# Patient Record
Sex: FEMALE | Race: WHITE | NOT HISPANIC OR LATINO | URBAN - METROPOLITAN AREA
[De-identification: names, ages, dates, MRNs, and addresses within clinical notes are randomized per-mention and may not be internally consistent; named-entity substitution may affect disease eponyms.]

---

## 2024-11-09 ENCOUNTER — EMERGENCY (EMERGENCY)
Facility: HOSPITAL | Age: 34
LOS: 1 days | Discharge: HOME | End: 2024-11-09
Attending: STUDENT IN AN ORGANIZED HEALTH CARE EDUCATION/TRAINING PROGRAM | Admitting: STUDENT IN AN ORGANIZED HEALTH CARE EDUCATION/TRAINING PROGRAM
Payer: COMMERCIAL

## 2024-11-09 VITALS
HEART RATE: 80 BPM | OXYGEN SATURATION: 98 % | HEIGHT: 65 IN | DIASTOLIC BLOOD PRESSURE: 80 MMHG | TEMPERATURE: 98 F | RESPIRATION RATE: 16 BRPM | SYSTOLIC BLOOD PRESSURE: 138 MMHG | WEIGHT: 199.96 LBS

## 2024-11-09 PROCEDURE — 73110 X-RAY EXAM OF WRIST: CPT | Mod: 26,RT

## 2024-11-09 PROCEDURE — 73090 X-RAY EXAM OF FOREARM: CPT

## 2024-11-09 PROCEDURE — 99284 EMERGENCY DEPT VISIT MOD MDM: CPT

## 2024-11-09 PROCEDURE — 73080 X-RAY EXAM OF ELBOW: CPT | Mod: 26,RT

## 2024-11-09 PROCEDURE — 73090 X-RAY EXAM OF FOREARM: CPT | Mod: 26,RT

## 2024-11-09 PROCEDURE — 73080 X-RAY EXAM OF ELBOW: CPT

## 2024-11-09 PROCEDURE — 73110 X-RAY EXAM OF WRIST: CPT

## 2024-11-09 PROCEDURE — 99284 EMERGENCY DEPT VISIT MOD MDM: CPT | Mod: 25

## 2024-11-09 RX ORDER — IBUPROFEN 200 MG
600 TABLET ORAL ONCE
Refills: 0 | Status: COMPLETED | OUTPATIENT
Start: 2024-11-09 | End: 2024-11-09

## 2024-11-09 RX ADMIN — Medication 600 MILLIGRAM(S): at 20:57

## 2024-11-09 NOTE — ED PROVIDER NOTE - NSFOLLOWUPINSTRUCTIONS_ED_ALL_ED_FT
Joint pain can be caused by many things. It may go away if you follow instructions from your health care provider for taking care of yourself at home. Sometimes, you may need more treatment.    Joint pain can be caused by:  Bruises at the area of the joint.  An injury caused by movements that are repeated.  Wear and tear on the joint as you get older.  Buildup of uric acid crystals in the joint. This is also called gout.  Irritation and swelling of the joint.  Types of arthritis.  Infections of the joint or of the bone.  Your provider may tell you to take pain medicine or wear an elastic bandage, sling, or splint. If your joint pain continues, you may need lab or imaging tests to find the cause of your joint pain.    Follow these instructions at home:  If you have an elastic bandage, sling, or splint that can be taken off:    Wear the bandage, sling, or splint as told by your provider. Take it off only if your provider says you can.  Check the skin under and around it every day. Tell your provider if you see problems.  Loosen it if your fingers or toes tingle, are numb, or turn cold and blue.  Keep it clean and dry.  Ask your provider if you should remove it before bathing.  If the bandage, sling, or splint is not waterproof:  Do not let it get wet.  Cover it when you take a bath or shower. Use a cover that does not let any water in.  Managing pain, stiffness, and swelling    Bag of ice on a towel on the skin.  A heating pad being used on the painful area.  If told, put ice on the area.  If you have an elastic bandage, sling, or splint that you can take off, remove it as told.  Put ice in a plastic bag.  Place a towel between your skin and the bag.  Leave the ice on for 20 minutes, 2–3 times a day.  If told, put heat on the area. Do this as often as told. Use the heat source that your provider recommends, such as a moist heat pack or a heating pad.  Place a towel between your skin and the heat source.  Leave the heat on for 20–30 minutes.  If your skin turns bright red, take off the ice or heat right away to prevent skin damage. The risk of damage is higher if you can't feel pain, heat, or cold.  Move your fingers or toes often to reduce stiffness and swelling.  Raise the injured area above the level of your heart while you're sitting or lying down. Use a pillow to support the painful area as needed.  Activity    Rest the painful joint as told.  Do not do things that cause pain or make pain worse.  Begin exercising or stretching the affected area as told by your provider.  Return to normal activities when you are told. Ask what things are safe for you to do.  General instructions    Take your medicines as told by your provider. Treatment may include medicines for pain and swelling that are taken by mouth or applied to the skin.  Do not smoke, vape, or use products with nicotine or tobacco in them. If you need help quitting, talk with your provider.  Keep all follow-up visits. Your provider will want to check on your condition.  Contact a health care provider if:  You have pain that does not get better with medicine.  Your joint pain does not improve within 3 days.  You have more bruising or swelling.  You have a fever.  You lose 10 lb (4.5 kg) or more without trying.  Get help right away if:  You cannot move the joint.  Your fingers or toes tingle, become numb, or turn cold and blue.  You have a fever along with a joint that's red, warm, and swollen. adial Head Fracture  Bones and nerves of the arm, with a close-up showing a type 3 fracture in the radial head.   A radial head fracture is a break in one of the bones of the forearm (radius), near the elbow joint. There are two bones in the forearm. The radius, or radial bone, is the bone on the side of the thumb.    There are different types of radial head fractures. The type is determined by the amount of movement (displacement) of bones from their normal positions:  Type 1. This is a small fracture in which the bone pieces remain together (nondisplaced fracture).  Type 2. The fracture is moderate, and bone pieces are slightly displaced.  Type 3. There are multiple fractures and displaced bone pieces.  What are the causes?  This condition is usually caused by falling and landing on an outstretched arm.    What increases the risk?  You are more likely to develop this condition if you:  Are an older female.  Participate in sports that are more likely to cause falls while running or jumping.  Have weak bones (osteoporosis).  What are the signs or symptoms?  Symptoms of this condition include:  Swelling of the elbow joint.  Pain and difficulty when moving the elbow joint, such as when straightening the elbow or rotating the forearm.  How is this diagnosed?  This condition is diagnosed based on your medical history and a physical exam. You may have X-rays to confirm the type of fracture.    How is this treated?  Treatment for this condition includes resting, icing, and raising (elevating) the injured area above the level of your heart. You may be given medicines to help relieve pain.    Treatment varies depending on the type of fracture you have.  For a type 1 fracture, you may be given a splint or sling to keep your arm and elbow from moving for several days.  For a type 2 fracture, you may be given a splint or sling to keep your arm and elbow from moving for several days. If the displacement is more severe, you may need surgery.  For a type 3 fracture, you will usually need surgery to have bone pieces removed. The entire radial head may need to be removed if the damage is severe. The fracture will be stabilized with a splint and sling.  Follow these instructions at home:  Medicines    Take over-the-counter and prescription medicines only as told by your health care provider.  Ask your health care provider if the medicine prescribed to you:  Requires you to avoid driving or using machinery.  Can cause constipation. You may need to take these actions to prevent or treat constipation:  Drink enough fluid to keep your urine pale yellow.  Take over-the-counter or prescription medicines.  Eat foods that are high in fiber, such as beans, whole grains, and fresh fruits and vegetables.  Limit foods that are high in fat and processed sugars, such as fried or sweet foods.  If you have a removable splint or sling:    Wear the splint or sling as told by your health care provider. Remove it only as told by your health care provider.  Check the skin around the splint or sling every day. Tell your health care provider about any concerns.  Loosen the splint or sling if your fingers tingle, become numb, or turn cold and blue.  Keep it clean and dry.  If you have a nonremovable splint:    Do not put pressure on any part of the splint until it is fully hardened. This may take several hours.  Do not stick anything inside the splint to scratch your skin. Doing that increases your risk of infection.  Check the skin around the splint every day. Tell your health care provider about any concerns.  You may put lotion on dry skin around the edges of the splint. Do not put lotion on the skin underneath the splint.  Keep it clean and dry.  Bathing    Do not take baths, swim, or use a hot tub until your health care provider approves. Ask your health care provider if you may take showers. You may only be allowed to take sponge baths.  If the splint or sling is not waterproof:  Do not let it get wet.  Cover it with a watertight covering when you take a bath or shower.  Managing pain, stiffness, and swelling    Bag of ice on a towel on the skin.  If directed, put ice on the injured area. To do this:  If you have a removable splint or sling, remove it as told by your health care provider.  Put ice in a plastic bag.  Place a towel between your skin and the bag or between your splint and the bag.  Leave the ice on for 20 minutes, 2–3 times a day.  Remove the ice if your skin turns bright red. This is very important. If you cannot feel pain, heat, or cold, you have a greater risk of damage to the area.  Move your fingers often to reduce stiffness and swelling.  Raise (elevate) the injured area above the level of your heart while you are sitting or lying down.  Activity    Ask your health care provider when it is safe to drive if you have a splint or sling on your arm.  Do not lift anything that is heavier than 10 lb (4.5 kg), or the limit that you are told, until your health care provider says that it is safe.  Return to your normal activities as told by your health care provider. Ask your health care provider what activities are safe for you.  Do exercises as told by your health care provider or physical therapist.  General instructions    Do not use any products that contain nicotine or tobacco. These products include cigarettes, chewing tobacco, and vaping devices, such as e-cigarettes. If you need help quitting, ask your health care provider.  Keep all follow-up visits. This is important.  Contact a health care provider if:  You have problems with your splint.  You have pain or swelling that gets worse.  Get help right away if:  You have severe pain, especially if the pain changes significantly or suddenly.  You have fluid or a bad smell coming from your splint.  Your hand or fingers get cold or turn pale or blue.  You lose feeling in any part of your hand or arm.  Summary  A radial head fracture is a break in one of the bones in your forearm (radius), near the elbow joint.  This condition is usually caused by falling and landing on an outstretched arm.  This condition may be treated with rest, ice, elevation, pain medicines, a splint or sling, and surgery. Treatment will vary depending on the type of fracture you have.

## 2024-11-09 NOTE — ED PROVIDER NOTE - CLINICAL SUMMARY MEDICAL DECISION MAKING FREE TEXT BOX
here with nonsyncopal fall, FOOSH. likely sprain. will get xray to r/o fracture. pain control. if negative imaging, likely dc with pcp fu. RICE educated.

## 2024-11-09 NOTE — ED ADULT NURSE NOTE - CHIEF COMPLAINT QUOTE
Pt complaints or Rt arm pain w/ numbness. Report tripped and fell an hour ago and landed on Rt arm. Denies LOC, head injury or any other injury at this time. Denies taking any blood thinner. Bruise noted on lower Rt extremity. Pt able to move fingers w/o discomfort.

## 2024-11-09 NOTE — ED ADULT NURSE NOTE - OBJECTIVE STATEMENT
pt reports s/p mechanical fall on the street and extended her right arm to break the fall and is now complaining of pain to right arm pt reports s/p mechanical fall on the street and extended her right arm to break the fall and is now complaining of pain to right arm. denies hitting head and or LOC, not on any anticoagulants.

## 2024-11-09 NOTE — ED PROVIDER NOTE - OBJECTIVE STATEMENT
34 F, denies pmhx, p/w R elbow pain x 1 hour. States she tripped and FOOSH. initially has R wrist pain that's subsiding but now has worsening elbow pain. Denied head trauma or other bodily injury. pain worse with arm movement but not to palpation.

## 2024-11-09 NOTE — ED ADULT TRIAGE NOTE - CHIEF COMPLAINT QUOTE
Pt complaints or Lt arm pain. Report tripped and fell an hour ago and landed on Lt arm. Denies LOC, head injury or any other injury at this time. Denies taking any blood thinner. Bruise noted on lower Lt extremity. Pt complaints or Rt arm pain w/ numbness. Report tripped and fell an hour ago and landed on Rt arm. Denies LOC, head injury or any other injury at this time. Denies taking any blood thinner. Bruise noted on lower Rt extremity. Pt complaints or Rt arm pain w/ numbness. Report tripped and fell an hour ago and landed on Rt arm. Denies LOC, head injury or any other injury at this time. Denies taking any blood thinner. Bruise noted on lower Rt extremity. Pt able to move fingers w/o discomfort.

## 2024-11-09 NOTE — ED PROVIDER NOTE - PHYSICAL EXAMINATION
VITAL SIGNS: I have reviewed nursing notes and confirm.  CONSTITUTIONAL: Well appearing, in no acute distress.   SKIN:  warm and dry, no acute rash.   HEAD:  normocephalic, atraumatic.  EYES: EOM intact; conjunctiva and sclera clear.  ENT: No nasal discharge; airway clear.   NECK: Supple.  CARD: S1, S2, Regular rate and rhythm.   RESP:  Clear to auscultation b/l, no wheezes, rales or rhonchi.  ABD: Normal bowel sounds; soft; non-distended; non-tender; no guarding/ rebound.  EXT: Normal ROM. No peripheral edema. Pulses intact and equal b/l. no erythema, swelling, tenderness of the R wrist, forearm, elbow. 5/5 strength with wrist flexion/extension and hand  but 4/5 with elbow flexion/extension. SILT. 2+ radial pulse. soft. able to make ok sign, thumb up sign, ab/adduct.   NEURO: Alert, oriented, grossly unremarkable VITAL SIGNS: I have reviewed nursing notes and confirm.  CONSTITUTIONAL: Well appearing, in no acute distress.   SKIN:  warm and dry, no acute rash.   HEAD:  normocephalic, atraumatic.  EYES: EOM intact; conjunctiva and sclera clear.  ENT: No nasal discharge; airway clear.   NECK: Supple.  CARD: S1, S2, Regular rate and rhythm.   RESP:  Clear to auscultation b/l, no wheezes, rales or rhonchi.  ABD: Normal bowel sounds; soft; non-distended; non-tender; no guarding/ rebound.  EXT: Normal ROM. No peripheral edema. Pulses intact and equal b/l. no erythema, swelling, tenderness of the R wrist, forearm, elbow. 5/5 strength with wrist flexion/extension and hand  but 4/5 with elbow flexion/extension. slight pain with supination of the forearm. SILT. 2+ radial pulse. soft. able to make ok sign, thumb up sign, ab/adduct.   NEURO: Alert, oriented, grossly unremarkable

## 2024-11-09 NOTE — ED PROVIDER NOTE - PATIENT PORTAL LINK FT
You can access the FollowMyHealth Patient Portal offered by [facility] by registering at the following website: [email or web address]. By joining [facility]’s FollowMyHealth portal, you will also be able to view your health information using other applications (apps) compatible with our system.

## 2024-11-09 NOTE — ED PROVIDER NOTE - PROGRESS NOTE DETAILS
pt updated on likely nondisplaced radial head fracture. patient is still able to range the elbow. will give sling. patient is returning to [facility]. Will follow up with orthopedics there. return precaution given. RICE education. patient has tylnelol and ibuprofen. will dc

## 2024-11-12 DIAGNOSIS — M25.521 PAIN IN RIGHT ELBOW: ICD-10-CM
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
## 2024-11-12 DIAGNOSIS — S52.121A DISPLACED FRACTURE OF HEAD OF RIGHT RADIUS, INITIAL ENCOUNTER FOR CLOSED FRACTURE: ICD-10-CM
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
